# Patient Record
(demographics unavailable — no encounter records)

---

## 2024-11-05 NOTE — REASON FOR VISIT
[Behavioral Health Urgent Care Assessment] : a behavioral health urgent care assessment [School] : school [Patient] : patient [Self] : alone [Other: _____] : with [unfilled] [TextBox_17] : Recent NSSIB and connection to care

## 2024-11-05 NOTE — DISCUSSION/SUMMARY
[Low acute suicide risk] : Low acute suicide risk [No] : No [Yes] : Safety Plan completed/updated (for individuals at risk): Yes [FreeTextEntry1] : Pt presents as low risk with risk factors including recent NSSIB and anxiety with significant protective factors such as strong family support, no prior suicide attempts, no substance use, willingness to engage in treatment, engaged in school, current denial of any SI, plan or intent or urges to self-harm, no reports hx of abuse, no aggression/violence, no access to guns and no legal hx.

## 2024-11-05 NOTE — PHYSICAL EXAM
[Well groomed] : well groomed [Cooperative] : cooperative [Euthymic] : euthymic [Full] : full [Clear] : clear [Linear/Goal Directed] : linear/goal directed [None Reported] : none reported [Average] : average [WNL] : within normal limits [Positive interaction] : positive interaction [Unremarkable/age appropriate] : unremarkable/age appropriate [Normal] : normal [None] : none

## 2024-11-05 NOTE — HISTORY OF PRESENT ILLNESS
[Not Applicable] : Not applicable [FreeTextEntry1] : Pt is a 12 year old female in 7th grade at [employer], regular education, domiciled with maternal grandfather and maternal great grandmother, w/no pph, no hx of inpt admissions, no outpatient treatment, no medication trials, no hx of abuse, no substance use or legal issues, no aggression or violence, BIB grandfather for evaluation due to recent NSSIB and connection to care.   The patient presented as actively engaged and cooperative during the individual evaluation, displaying appropriate affect. She reported being referred to the behavioral health center following an incident of non-suicidal self-injury (NSSI). She described lying in bed and experiencing an urge to cut herself, subsequently using a razor blade to create superficial cuts on her arm. The patient stated she was unsure of the reason behind the NSSI and that it did not provide any relief. She denied current urges to self-harm and denied any suicidal ideation. Reported symptoms of increased irritability, somatic symptoms such as stomachaches, anxiety, a sense of impending doom, poor focus, easy distractibility, fidgeting, shyness, and isolation. She denied suicidal or homicidal ideation, auditory or visual hallucinations, michelle, and psychosis, as well as any history of aggression or violence. While unable to identify a specific trigger for the NSSI, the patient reported feeling overwhelmed with schoolwork. She resides with her maternal grandfather and great-grandmother due to the death of both parents, her mother having  in a car accident when the patient was three years old. While she described her living environment as "okay," she noted that her grandfather and great-grandmother argue at times. The patient endorsed adequate social supports and denied being bullied, despite acknowledging difficulty engaging with friends. She denied any history of panic attacks, substance use, or legal issues. The patient expressed motivation to engage in outpatient therapy and denied any acute safety concerns.  Collateral information was obtained from the patient's maternal grandfather, her legal guardian, whose guardianship paperwork was reviewed. The grandfather reported that the patient has been in his care since her mother's death in a motor vehicle accident when the patient was three years old. The patient has no contact with her biological father, who is also  (approximately one year prior). The grandfather reported the patient presented to the school counselor following a peer report of non-suicidal self-injury (NSSI). He denied prior knowledge of any self-harm, suicidal ideation, or attempts. The patient is reportedly isolative at home, preferring to stay in her room, but the grandfather denied any significant changes in her behavior, mood, or affect. He reported normal sleep and appetite, denied changes in energy or motivation, and stated the patient has adequate social supports and is not bullied. Academically, the patient performs well, with some struggles noted in math and Estonian. The grandfather denied any history of aggression, violence, substance use, or legal issues. He denies history of HI/AH/VH, michelle, and psychosis. He described the patient as cooperative, without behavioral problems. He denied a history of anxiety or panic attacks in the patient, reported typical childhood development without delays, and denied any significant medical history or allergies. There is no known family history of mental illness. The grandfather denied acute safety concerns, was receptive to psychoeducation regarding means restriction, and agreed with the recommendation for outpatient therapy. The school counselor was contacted via email and provided with a brief overview and treatment recommendations. [FreeTextEntry2] : No inpt admissions, no hx of outpatient treatment, no medication trials [FreeTextEntry3] : n/a

## 2024-11-05 NOTE — HISTORY OF PRESENT ILLNESS
[Not Applicable] : Not applicable [FreeTextEntry2] : No inpt admissions, no hx of outpatient treatment, no medication trials [FreeTextEntry1] : Pt is a 12 year old female in 7th grade at [employer], regular education, domiciled with maternal grandfather and maternal great grandmother, w/no pph, no hx of inpt admissions, no outpatient treatment, no medication trials, no hx of abuse, no substance use or legal issues, no aggression or violence, BIB grandfather for evaluation due to recent NSSIB and connection to care.   The patient presented as actively engaged and cooperative during the individual evaluation, displaying appropriate affect. She reported being referred to the behavioral health center following an incident of non-suicidal self-injury (NSSI). She described lying in bed and experiencing an urge to cut herself, subsequently using a razor blade to create superficial cuts on her arm. The patient stated she was unsure of the reason behind the NSSI and that it did not provide any relief. She denied current urges to self-harm and denied any suicidal ideation. Reported symptoms of increased irritability, somatic symptoms such as stomachaches, anxiety, a sense of impending doom, poor focus, easy distractibility, fidgeting, shyness, and isolation. She denied suicidal or homicidal ideation, auditory or visual hallucinations, michelle, and psychosis, as well as any history of aggression or violence. While unable to identify a specific trigger for the NSSI, the patient reported feeling overwhelmed with schoolwork. She resides with her maternal grandfather and great-grandmother due to the death of both parents, her mother having  in a car accident when the patient was three years old. While she described her living environment as "okay," she noted that her grandfather and great-grandmother argue at times. The patient endorsed adequate social supports and denied being bullied, despite acknowledging difficulty engaging with friends. She denied any history of panic attacks, substance use, or legal issues. The patient expressed motivation to engage in outpatient therapy and denied any acute safety concerns.  Collateral information was obtained from the patient's maternal grandfather, her legal guardian, whose guardianship paperwork was reviewed. The grandfather reported that the patient has been in his care since her mother's death in a motor vehicle accident when the patient was three years old. The patient has no contact with her biological father, who is also  (approximately one year prior). The grandfather reported the patient presented to the school counselor following a peer report of non-suicidal self-injury (NSSI). He denied prior knowledge of any self-harm, suicidal ideation, or attempts. The patient is reportedly isolative at home, preferring to stay in her room, but the grandfather denied any significant changes in her behavior, mood, or affect. He reported normal sleep and appetite, denied changes in energy or motivation, and stated the patient has adequate social supports and is not bullied. Academically, the patient performs well, with some struggles noted in math and Georgian. The grandfather denied any history of aggression, violence, substance use, or legal issues. He denies history of HI/AH/VH, michelle, and psychosis. He described the patient as cooperative, without behavioral problems. He denied a history of anxiety or panic attacks in the patient, reported typical childhood development without delays, and denied any significant medical history or allergies. There is no known family history of mental illness. The grandfather denied acute safety concerns, was receptive to psychoeducation regarding means restriction, and agreed with the recommendation for outpatient therapy. The school counselor was contacted via email and provided with a brief overview and treatment recommendations. [FreeTextEntry3] : n/a

## 2024-11-05 NOTE — RISK ASSESSMENT
[Clinical Interview] : Clinical Interview [Collateral Sources] : Collateral Sources [No] : No [Cluster B Personality disorder/traits] : cluster B personality disorder/traits [Family Hx of suicide/suicidal behavior] : family history of suicide/suicidal behavior [Non-compliant or not receiving treatment] : non-compliant or not receiving treatment [None known] : None known [Supportive social network of family or friends] : supportive social network of family or friends [Engaged in work or school] : engaged in work or school [None in the patient's lifetime] : None in the patient's lifetime [None Known] : none known [No known risk factors] : No known risk factors [Residential stability] : residential stability [Relationship stability] : relationship stability [Yes] : yes [FreeTextEntry1] : Pt denies SI, plan or intent and urges for NSSIB [de-identified] : Pt has no access to guns

## 2024-11-05 NOTE — RISK ASSESSMENT
[Clinical Interview] : Clinical Interview [Collateral Sources] : Collateral Sources [No] : No [Cluster B Personality disorder/traits] : cluster B personality disorder/traits [Family Hx of suicide/suicidal behavior] : family history of suicide/suicidal behavior [Non-compliant or not receiving treatment] : non-compliant or not receiving treatment [None known] : None known [Supportive social network of family or friends] : supportive social network of family or friends [Engaged in work or school] : engaged in work or school [None in the patient's lifetime] : None in the patient's lifetime [None Known] : none known [No known risk factors] : No known risk factors [Residential stability] : residential stability [Relationship stability] : relationship stability [Yes] : yes [FreeTextEntry1] : Pt denies SI, plan or intent and urges for NSSIB [de-identified] : Pt has no access to guns

## 2024-11-05 NOTE — ADDENDUM
[FreeTextEntry1] : Patient was seen and examined by me, Dr. Milagro Chung. I reviewed and agreed with the findings and plan as documented in the [facility] note, unless noted below.

## 2024-11-05 NOTE — PLAN
[Provision of National Suicide Prevention Lifeline 4-218-345-TALK (0425)] : Provision of national suicide prevention lifeline 3-102-214-talk (6721) [Patient] : patient [Family] : family [Education provided regarding environmental safety/ lethal means restriction] : Education provided regarding environmental safety/ lethal means restriction [Contact was Attempted] : contact was attempted [None on Record] : none on record [Reached regarding Plan] : not reached regarding plan [TextBox_9] : Referral for outpatient therapist  [TextBox_11] : not currently indicated. should have a trial of therapy first  [TextBox_13] : Safety plan completed with patient using the [place] Safety Plan."  The Safety Plan is a best practice recommendation by the Suicide Prevention Resource Center.  Safety planning reviewed with patient & family. Advised to secure all potentially dangerous items from home, including but not limited to sharp objects, weapons, prescription and non-prescription medications, and other lethal means out of patient's reach. They deny having any firearms at home. Parent agreed. Parent and patient advised to visit the nearest ED or call 911 for any worsening symptoms or if safety concerns arise. 1800-LIFENET provided. All involved verbalized understanding.   [TextBox_26] : Emailed school counselor to provide brief overview and plan for treatment

## 2024-11-05 NOTE — PLAN
[Provision of National Suicide Prevention Lifeline 6-728-138-TALK (3035)] : Provision of national suicide prevention lifeline 3-567-802-talk (0079) [Patient] : patient [Family] : family [Education provided regarding environmental safety/ lethal means restriction] : Education provided regarding environmental safety/ lethal means restriction [Contact was Attempted] : contact was attempted [None on Record] : none on record [Reached regarding Plan] : not reached regarding plan [TextBox_9] : Referral for outpatient therapist  [TextBox_11] : not currently indicated. should have a trial of therapy first  [TextBox_13] : Safety plan completed with patient using the [place]-Brown Safety Plan."  The Safety Plan is a best practice recommendation by the Suicide Prevention Resource Center.  Safety planning reviewed with patient & family. Advised to secure all potentially dangerous items from home, including but not limited to sharp objects, weapons, prescription and non-prescription medications, and other lethal means out of patient's reach. They deny having any firearms at home. Parent agreed. Parent and patient advised to visit the nearest ED or call 911 for any worsening symptoms or if safety concerns arise. 1800-LIFENET provided. All involved verbalized understanding.   [TextBox_26] : Emailed school counselor to provide brief overview and plan for treatment

## 2024-11-05 NOTE — HISTORY OF PRESENT ILLNESS
[Not Applicable] : Not applicable [FreeTextEntry1] : Pt is a 12 year old female in 7th grade at [employer], regular education, domiciled with maternal grandfather and maternal great grandmother, w/no pph, no hx of inpt admissions, no outpatient treatment, no medication trials, no hx of abuse, no substance use or legal issues, no aggression or violence, BIB grandfather for evaluation due to recent NSSIB and connection to care.   The patient presented as actively engaged and cooperative during the individual evaluation, displaying appropriate affect. She reported being referred to the behavioral health center following an incident of non-suicidal self-injury (NSSI). She described lying in bed and experiencing an urge to cut herself, subsequently using a razor blade to create superficial cuts on her arm. The patient stated she was unsure of the reason behind the NSSI and that it did not provide any relief. She denied current urges to self-harm and denied any suicidal ideation. Reported symptoms of increased irritability, somatic symptoms such as stomachaches, anxiety, a sense of impending doom, poor focus, easy distractibility, fidgeting, shyness, and isolation. She denied suicidal or homicidal ideation, auditory or visual hallucinations, michelle, and psychosis, as well as any history of aggression or violence. While unable to identify a specific trigger for the NSSI, the patient reported feeling overwhelmed with schoolwork. She resides with her maternal grandfather and great-grandmother due to the death of both parents, her mother having  in a car accident when the patient was three years old. While she described her living environment as "okay," she noted that her grandfather and great-grandmother argue at times. The patient endorsed adequate social supports and denied being bullied, despite acknowledging difficulty engaging with friends. She denied any history of panic attacks, substance use, or legal issues. The patient expressed motivation to engage in outpatient therapy and denied any acute safety concerns.  Collateral information was obtained from the patient's maternal grandfather, her legal guardian, whose guardianship paperwork was reviewed. The grandfather reported that the patient has been in his care since her mother's death in a motor vehicle accident when the patient was three years old. The patient has no contact with her biological father, who is also  (approximately one year prior). The grandfather reported the patient presented to the school counselor following a peer report of non-suicidal self-injury (NSSI). He denied prior knowledge of any self-harm, suicidal ideation, or attempts. The patient is reportedly isolative at home, preferring to stay in her room, but the grandfather denied any significant changes in her behavior, mood, or affect. He reported normal sleep and appetite, denied changes in energy or motivation, and stated the patient has adequate social supports and is not bullied. Academically, the patient performs well, with some struggles noted in math and Urdu. The grandfather denied any history of aggression, violence, substance use, or legal issues. He denies history of HI/AH/VH, michelle, and psychosis. He described the patient as cooperative, without behavioral problems. He denied a history of anxiety or panic attacks in the patient, reported typical childhood development without delays, and denied any significant medical history or allergies. There is no known family history of mental illness. The grandfather denied acute safety concerns, was receptive to psychoeducation regarding means restriction, and agreed with the recommendation for outpatient therapy. The school counselor was contacted via email and provided with a brief overview and treatment recommendations. [FreeTextEntry2] : No inpt admissions, no hx of outpatient treatment, no medication trials [FreeTextEntry3] : n/a

## 2024-11-05 NOTE — RISK ASSESSMENT
[Clinical Interview] : Clinical Interview [Collateral Sources] : Collateral Sources [No] : No [Cluster B Personality disorder/traits] : cluster B personality disorder/traits [Family Hx of suicide/suicidal behavior] : family history of suicide/suicidal behavior [Non-compliant or not receiving treatment] : non-compliant or not receiving treatment [None known] : None known [Supportive social network of family or friends] : supportive social network of family or friends [Engaged in work or school] : engaged in work or school [None in the patient's lifetime] : None in the patient's lifetime [None Known] : none known [No known risk factors] : No known risk factors [Residential stability] : residential stability [Relationship stability] : relationship stability [Yes] : yes [FreeTextEntry1] : Pt denies SI, plan or intent and urges for NSSIB [de-identified] : Pt has no access to guns

## 2024-11-05 NOTE — SOCIAL HISTORY
[No Known Substance Use] : no known substance use [FreeTextEntry1] : Pt lives with legal guardians, maternal grandfather and maternal great grandmother due to pt's mother passing away in a car accident when pt was 3. Pt had no contact with father who  when pt was 11

## 2024-12-26 NOTE — HISTORY OF PRESENT ILLNESS
[Mother] : mother [Yes] : Patient goes to dentist yearly [Toothpaste] : Primary Fluoride Source: Toothpaste [Up to date] : Up to date [Normal] : normal [Eats meals with family] : eats meals with family [Normal Performance] : normal performance [Normal Behavior/Attention] : normal behavior/attention [de-identified] : sees therapist

## 2025-02-22 NOTE — DISCUSSION/SUMMARY
[FreeTextEntry1] : Treat symptoms as needed Discussed pathophysiology of GE Clear fluids, advance diet slowly, lactose free diet start probiotics Return if fever/worsening/concerns

## 2025-02-24 NOTE — REVIEW OF SYSTEMS
[Change in Weight] : change in weight [Appetite Changes] : appetite changes [Vomiting] : vomiting [Diarrhea] : diarrhea [Abdominal Pain] : abdominal pain [Negative] : Genitourinary

## 2025-02-24 NOTE — DISCUSSION/SUMMARY
[FreeTextEntry1] : 12 YR OLD WITH ABD PAIN, VOMITING T/C TO LAB (RAP NEG) URINE C/S TO LAB INCREASE CLEARS ADV SOLIDS AS JANE, ADV TO A BLAND/DURAN IET AVOID FRUIT JUICE/DAIRY PROBIOTICS REST NOTIFY OFFICE IF S/S PERSIST OR WORSEN REST   [] : The components of the vaccine(s) to be administered today are listed in the plan of care. The disease(s) for which the vaccine(s) are intended to prevent and the risks have been discussed with the caretaker.  The risks are also included in the appropriate vaccination information statements which have been provided to the patient's caregiver.  The caregiver has given consent to vaccinate.

## 2025-02-24 NOTE — PHYSICAL EXAM
[Acute Distress] : no acute distress [Alert] : alert [EOMI] : grossly EOMI [Supple] : supple [FROM] : full passive range of motion [Clear to Auscultation Bilaterally] : clear to auscultation bilaterally [Regular Rate and Rhythm] : regular rate and rhythm [Normal S1, S2 audible] : normal S1, S2 audible [Murmur] : no murmur [Soft] : soft [Tender] : nontender [Distended] : nondistended [Normal Bowel Sounds] : normal bowel sounds [Hepatosplenomegaly] : no hepatosplenomegaly [No Abnormal Lymph Nodes Palpated] : no abnormal lymph nodes palpated [Moves All Extremities x 4] : moves all extremities x4 [Warm, Well Perfused x4] : warm, well perfused x4 [Capillary Refill <2s] : capillary refill < 2s [Normotonic] : normotonic [NL] : warm, clear [Warm] : warm [Clear] : clear [FreeTextEntry1] : APPROPRIATE, INTERACTIVE [FreeTextEntry5] : AMBIKA

## 2025-02-24 NOTE — HISTORY OF PRESENT ILLNESS
[de-identified] :  VOMITING AND DIARRHEA, ABD PAINS [FreeTextEntry6] : PER GRANDMA, PT SENT HOME FOR VOMITING IN SCHOOL C/O FEELING DIZZY AND WEAK PER PT NKDA  VOIDING YET NOT EATING MUCH- NOT HUNGRY PER PT VOMITED IN SCHOOL TODAY BUT DIDNOT EAT BREAKFAST